# Patient Record
Sex: MALE | Race: OTHER | HISPANIC OR LATINO | URBAN - METROPOLITAN AREA
[De-identification: names, ages, dates, MRNs, and addresses within clinical notes are randomized per-mention and may not be internally consistent; named-entity substitution may affect disease eponyms.]

---

## 2020-01-09 ENCOUNTER — EMERGENCY (EMERGENCY)
Facility: HOSPITAL | Age: 25
LOS: 0 days | Discharge: HOME | End: 2020-01-09
Attending: EMERGENCY MEDICINE
Payer: MEDICAID

## 2020-01-09 VITALS
HEART RATE: 72 BPM | DIASTOLIC BLOOD PRESSURE: 75 MMHG | TEMPERATURE: 98 F | OXYGEN SATURATION: 100 % | RESPIRATION RATE: 18 BRPM | SYSTOLIC BLOOD PRESSURE: 136 MMHG

## 2020-01-09 VITALS — HEIGHT: 66 IN | WEIGHT: 125 LBS

## 2020-01-09 DIAGNOSIS — L29.9 PRURITUS, UNSPECIFIED: ICD-10-CM

## 2020-01-09 DIAGNOSIS — B01.9 VARICELLA WITHOUT COMPLICATION: ICD-10-CM

## 2020-01-09 DIAGNOSIS — R21 RASH AND OTHER NONSPECIFIC SKIN ERUPTION: ICD-10-CM

## 2020-01-09 PROCEDURE — 99282 EMERGENCY DEPT VISIT SF MDM: CPT

## 2020-01-09 PROCEDURE — 99283 EMERGENCY DEPT VISIT LOW MDM: CPT

## 2020-01-09 NOTE — ED STATDOCS - NSFOLLOWUPINSTRUCTIONS_ED_ALL_ED_FT
Please call and follow up with your doctor in 1-3 days.  Please call [phone] to find doctors in [facility].     Varicela, en adultos  Chickenpox, Adult  La varicela es harshad infección causada por un virus (infección viral). Provoca fiebre y luego harshad erupción cutánea que pica. Esta erupción se convierte en ampollas, que por último se convierten en costras. La varicela se transmite fácilmente de persona a persona (es contagiosa). La varicela comienza a ser contagiosa de 1 a 2 días antes de que aparezca la erupción cutánea. Sigue siéndolo hasta que las ampollas se convierten en costras. La varicela puede ser muy grave en los adultos. Entre las posibles complicaciones de la varicela, se incluyen:  Infección en la piel.Infección pulmonar (neumonía).Infección en el cerebro (encefalitis).Infección en el torrente sanguíneo (sepsis).Problemas de sangrado.Problemas de equilibrio y control muscular (ataxia cerebelosa).Nacimiento de un bebé con defectos congénitos, en las mujeres embarazadas.Si usted ya ha tenido varicela antes, es probable que no la tenga de nuevo.  ¿Cuáles son las causas?  Esta afección es causada por el virus de la varicela zóster. Puede contraer el virus de las siguientes maneras:  Al aspirar las gotitas que harshad persona infectada elimina al toser o estornudar.Al tener contacto con líquidos de la erupción de la varicela.Al tocar algo que estuvo expuesto al virus (fue contaminado) y luego tocarse la boca, nariz u ojos.¿Qué incrementa el riesgo?  Es más probable que esta afección se manifieste en:  Personas que nunca tuvieron varicela.Personas que nunca recibieron un medicamento para protegerse de la varicela (no rogers sido vacunadas).Trabajadores sanitarios.Personas que viven o pasan tiempo en lugares con mucha gente. Estos pueden incluir lo siguiente:  Estudiantes universitarios.Maestros.Militares.Personas que viven en harshad institución, janie harshad prisión.Personas que tienen debilitado el sistema que combate las enfermedades (inmunitario). Puede tener un sistema inmunitario debilitado si:  Tiene VIH virus de inmunodeficiencia humana.Tiene sida (síndrome de inmunodeficiencia adquirida).Está recibiendo quimioterapia.Usa medicamentos que reducen (suprimen) la actividad del sistema inmunitario.¿Cuáles son los signos o los síntomas?  Los síntomas de la varicela suelen ser peores en los adultos que en los niños. Entre los síntomas se pueden incluir los siguientes:  Harshad erupción cutánea que pica y se modifica con el tiempo:  La erupción cutánea comienza janie manchas spencer que luego se convierten en pequeñas protuberancias.Estas protuberancias se convierten en ampollas llenas de líquido.Las ampollas se secan y se convierten en costras, por lo general, de 3 a 7 días después de que comenzó la erupción cutánea.[place] y molestias corporales.Dolor de yoon.Cansancio.Fiebre.Los síntomas se presentan de 10 a 21 días después de que harshad persona ha estado expuesta al virus.  ¿Cómo se diagnostica?  Esta afección se puede diagnosticar en función de lo siguiente:  Blanca síntomas.Blanca antecedentes médicos.Un examen físico.Análisis de gordon.Análisis de harshad muestra de líquido (cultivo) de la erupción.¿Cómo se trata?  El tratamiento de esta afección puede incluir lo siguiente:  Susan medicamentos para acortar el curso de la enfermedad y hacerla menos grave.Aplicar harshad loción de calamina para aliviar la picazón.Usar bicarbonato de sodio o cassia de claudy para calmar la picazón en la piel.Susan un medicamento que alivie la picazón (antihistamínico).Susan antibióticos si también hay harshad infección bacteriana. Los antibióticos no curan las infecciones virales janie la varicela.Siga estas indicaciones en gregg casa:  Para aliviar el dolor, la picazón y las molestias        Trate de mantenerse fresco y al resguardo aj. La transpiración y el calor pueden empeorar la picazón.Los cassia con agua fría pueden brindar alivio. Pruebe agregar bicarbonato de sodio o claudy seca en el agua para reducir la picazón. No se bañe con [place].Aplique paños húmedos fríos (compresas frías) en las zonas que le piquen janie se lo haya indicado el médico.Use loción de calamina según se lo haya recomendado el médico. Se trata de harshad loción de venta angle que ayuda a aliviar la picazón.Si tiene ampollas en la boca, no coma ni lola nada condimentado, catarino o ácido. Las bebidas frías y los alimentos blandos y suaves son los más fáciles de tragar.No se rasque ni se toque la erupción cutánea. Para ayudar a evitar rascarse:  Tenga siempre las uñas cortas y limpias.Use guantes o mitones mientras duerme, si no puede dejar de rascarse.Medicamentos     [place] o aplíquese los medicamentos de venta angle y los recetados solamente janie se lo haya indicado el médico. Estos incluyen antihistamínicos y cremas contra la picazón.Si le recetaron un antibiótico, tómelo janie se lo haya indicado el médico. No deje de usar el medicamento aunque la afección mejore.Prevención de infecciones        Usted comienza a contagiar de 1 a 2 días antes de que aparezca la erupción cutánea. Y seguirá contagiando hasta que las ampollas se convierten en costras. Mientras contagie, evite estar cerca de:  Mujeres embarazadas.Bebés.Personas que reciben tratamientos contra el cáncer o corticoesteroides a ajit plazo.Personas con un sistema inmunitario débil.Personas de edad avanzada.Personas que no hayan tenido varicela antes.Personas que no se hayan vacunado contra la varicela.Quédese en gregg casa y no vaya al trabajo o a la escuela hasta que todas las ampollas se hayan convertido en costras y dejen de aparecer manchas nuevas, o por todo el tiempo que le indique gregg médico.Si alguien ha estado expuesto a gregg varicela, comuníquese con un médico para bello si la persona necesita vacunarse.Lávese las derik frecuentemente con agua y jabón. Use desinfectante para derik si no dispone de agua y jabón. Corrina que las otras personas que viven en gregg hogar también se laven las derik. Hacer esto:  Reduce blanca probabilidades de contraer harshad infección bacteriana en la piel.Reduce la probabilidad de que la varicela se contagie a otras personas.Instrucciones generales     Lola suficiente líquido janie para mantener la orina de color amarillo pálido.Descanse todo lo que sea necesario.Concurra a todas las visitas de seguimiento janie se lo haya indicado el médico. [place] es importante.¿Cómo se amy?  Vacunarse es la mejor manera de prevenir la varicela. Si no se ha vacunado, hable con gregg médico sobre la posibilidad de recibir la vacuna.Comuníquese con un médico si:  Tiene fiebre.Observa harshad secreción de color ramires amarillento proveniente de las ampollas.Tiene zonas de la piel que se vuelven spencer o dolorosas con la palpación o que se sienten calientes.Tiene tos.La orina es de color más oscuro que lo habitual.Solicite ayuda de inmediato si:  No puede dejar de vomitar.Se siente confundido.Está muy somnoliento.Tiene alguno de estos problemas:  Rigidez en el pauly.Dolor de yoon intenso.Dolor intenso o rigidez en las articulaciones.Dificultad para caminar o mantener el equilibrio.Convulsiones.Respiración acelerada.Dificultad para respirar.Dolor en el pecho.Dolor en los ojos, ojos rojos o dificultad para bello.Gordon en la orina o en la materia fecal.Comienza a tener muchos moretones en la piel.Las ampollas le sangran.Ampollas en los ojos.Tiene fiebre, y los síntomas empeoran repentinamente.Resumen  La varicela es harshad infección causada por un virus (infección viral).Provoca fiebre y luego harshad erupción cutánea que pica. Esta erupción se convierte en ampollas, que por último se convierten en costras.La varicela comienza a ser contagiosa de harshad persona a otra de 1 a 2 días antes de que aparezca la erupción cutánea. Sigue siéndolo hasta que las ampollas se convierten en costras.Vacunarse es la mejor manera de prevenir la varicela.Esta información no tiene janie fin reemplazar el consejo del médico. Asegúrese de hacerle al médico cualquier pregunta que tenga.

## 2020-01-09 NOTE — ED STATDOCS - SKIN, MLM
skin normal color for race, warm, dry and intact. +pt with rash neck and torso rash in different stages of healing multiple rashes with red base fluid filled sac.

## 2020-01-09 NOTE — ED STATDOCS - OBJECTIVE STATEMENT
23 y/o male with no pertinent PMHx presents to ED c/o bumps over his body since x2 days. Pt had some blisters on back +discharge from area x2 days ago.  Denies any other sx currently. Denies fevers. Pt is Malay speaking, pacific  used: [id number]. NKDA.

## 2020-01-09 NOTE — ED STATDOCS - CLINICAL SUMMARY MEDICAL DECISION MAKING FREE TEXT BOX
pt with rash on body consistent with varicella, discuss with pt symptomatic treatment and discharge.

## 2020-01-09 NOTE — ED STATDOCS - PATIENT PORTAL LINK FT
You can access the FollowMyHealth Patient Portal offered by [facility] by registering at the following website: [email or web address]. By joining [employer]’s FollowMyHealth portal, you will also be able to view your health information using other applications (apps) compatible with our system.

## 2021-09-29 NOTE — ED STATDOCS - NS ED ATTENDING STATEMENT MOD
Patient received a 250cc bolus for decreased urinary out put and lowering blood pressure per Dr. Tyler.   Attending Only

## 2022-04-14 NOTE — ED STATDOCS - CONSTITUTIONAL NEGATIVE STATEMENT, MLM
Chief Complaint   Patient presents with     Botox Migraine     Cheryl Castellanos     no fever and no chills.

## 2022-06-23 NOTE — ED STATDOCS - DISCHARGE DATE
09-Jan-2020 Xolair Pregnancy And Lactation Text: This medication is Pregnancy Category B and is considered safe during pregnancy. This medication is excreted in breast milk.

## 2023-06-22 ENCOUNTER — EMERGENCY (EMERGENCY)
Facility: HOSPITAL | Age: 28
LOS: 0 days | Discharge: HOME | End: 2023-06-23
Attending: EMERGENCY MEDICINE
Payer: MEDICAID

## 2023-06-22 VITALS — HEIGHT: 69 IN | WEIGHT: 154.98 LBS

## 2023-06-22 DIAGNOSIS — L03.90 CELLULITIS, UNSPECIFIED: ICD-10-CM

## 2023-06-22 DIAGNOSIS — R21 RASH AND OTHER NONSPECIFIC SKIN ERUPTION: ICD-10-CM

## 2023-06-22 PROBLEM — Z78.9 OTHER SPECIFIED HEALTH STATUS: Chronic | Status: ACTIVE | Noted: 2020-01-09

## 2023-06-22 LAB
BASOPHILS # BLD AUTO: 0.05 K/UL — SIGNIFICANT CHANGE UP (ref 0–0.2)
BASOPHILS NFR BLD AUTO: 0.8 % — SIGNIFICANT CHANGE UP (ref 0–2)
EOSINOPHIL # BLD AUTO: 0.24 K/UL — SIGNIFICANT CHANGE UP (ref 0–0.5)
EOSINOPHIL NFR BLD AUTO: 4 % — SIGNIFICANT CHANGE UP (ref 0–6)
HCT VFR BLD CALC: 44.9 % — SIGNIFICANT CHANGE UP (ref 39–50)
HGB BLD-MCNC: 15 G/DL — SIGNIFICANT CHANGE UP (ref 13–17)
IMM GRANULOCYTES NFR BLD AUTO: 0.3 % — SIGNIFICANT CHANGE UP (ref 0–0.9)
LYMPHOCYTES # BLD AUTO: 2.1 K/UL — SIGNIFICANT CHANGE UP (ref 1–3.3)
LYMPHOCYTES # BLD AUTO: 35 % — SIGNIFICANT CHANGE UP (ref 13–44)
MCHC RBC-ENTMCNC: 29.6 PG — SIGNIFICANT CHANGE UP (ref 27–34)
MCHC RBC-ENTMCNC: 33.4 GM/DL — SIGNIFICANT CHANGE UP (ref 32–36)
MCV RBC AUTO: 88.6 FL — SIGNIFICANT CHANGE UP (ref 80–100)
MONOCYTES # BLD AUTO: 0.68 K/UL — SIGNIFICANT CHANGE UP (ref 0–0.9)
MONOCYTES NFR BLD AUTO: 11.3 % — SIGNIFICANT CHANGE UP (ref 2–14)
NEUTROPHILS # BLD AUTO: 2.91 K/UL — SIGNIFICANT CHANGE UP (ref 1.8–7.4)
NEUTROPHILS NFR BLD AUTO: 48.6 % — SIGNIFICANT CHANGE UP (ref 43–77)
PLATELET # BLD AUTO: 227 K/UL — SIGNIFICANT CHANGE UP (ref 150–400)
RBC # BLD: 5.07 M/UL — SIGNIFICANT CHANGE UP (ref 4.2–5.8)
RBC # FLD: 12.3 % — SIGNIFICANT CHANGE UP (ref 10.3–14.5)
WBC # BLD: 6 K/UL — SIGNIFICANT CHANGE UP (ref 3.8–10.5)
WBC # FLD AUTO: 6 K/UL — SIGNIFICANT CHANGE UP (ref 3.8–10.5)

## 2023-06-22 PROCEDURE — 86753 PROTOZOA ANTIBODY NOS: CPT

## 2023-06-22 PROCEDURE — 87476 LYME DIS DNA AMP PROBE: CPT

## 2023-06-22 PROCEDURE — 99284 EMERGENCY DEPT VISIT MOD MDM: CPT

## 2023-06-22 PROCEDURE — 80053 COMPREHEN METABOLIC PANEL: CPT

## 2023-06-22 PROCEDURE — 85025 COMPLETE CBC W/AUTO DIFF WBC: CPT

## 2023-06-22 PROCEDURE — 36415 COLL VENOUS BLD VENIPUNCTURE: CPT

## 2023-06-22 RX ADMIN — Medication 100 MILLIGRAM(S): at 23:53

## 2023-06-22 NOTE — ED ADULT NURSE NOTE - OBJECTIVE STATEMENT
pt c/o left shoulder blade rash since sunday. pt has been using cortisone cream since yesterday for itch relief. pt denies any pain

## 2023-06-22 NOTE — ED ADULT NURSE NOTE - CAS EDN DISCHARGE ASSESSMENT
no acute respiratory distress, ambulating with steady gait, bulls eye rash noted to left scapula area, no c/o pain , no drainage/Alert and oriented to person, place and time

## 2023-06-22 NOTE — ED ADULT NURSE NOTE - NSFALLUNIVINTERV_ED_ALL_ED
Bed/Stretcher in lowest position, wheels locked, appropriate side rails in place/Call bell, personal items and telephone in reach/Instruct patient to call for assistance before getting out of bed/chair/stretcher/Non-slip footwear applied when patient is off stretcher/Challenge to call system/Physically safe environment - no spills, clutter or unnecessary equipment/Purposeful proactive rounding/Room/bathroom lighting operational, light cord in reach

## 2023-06-22 NOTE — ED ADULT TRIAGE NOTE - CHIEF COMPLAINT QUOTE
pt c/o left shoulder blade rash since sunday. pt has been using cortisone cream since yesterday for itch relief. pt denies any pain. - allergies, - pmhx. Pt A&ox4, ambulatory. No s/s of distress.

## 2023-06-23 VITALS
SYSTOLIC BLOOD PRESSURE: 102 MMHG | RESPIRATION RATE: 18 BRPM | TEMPERATURE: 98 F | DIASTOLIC BLOOD PRESSURE: 50 MMHG | HEART RATE: 60 BPM | OXYGEN SATURATION: 100 %

## 2023-06-23 LAB
ALBUMIN SERPL ELPH-MCNC: 4 G/DL — SIGNIFICANT CHANGE UP (ref 3.3–5)
ALP SERPL-CCNC: 90 U/L — SIGNIFICANT CHANGE UP (ref 40–120)
ALT FLD-CCNC: 25 U/L — SIGNIFICANT CHANGE UP (ref 12–78)
ANION GAP SERPL CALC-SCNC: 3 MMOL/L — LOW (ref 5–17)
AST SERPL-CCNC: 27 U/L — SIGNIFICANT CHANGE UP (ref 15–37)
BILIRUB SERPL-MCNC: 0.4 MG/DL — SIGNIFICANT CHANGE UP (ref 0.2–1.2)
BUN SERPL-MCNC: 14 MG/DL — SIGNIFICANT CHANGE UP (ref 7–23)
CALCIUM SERPL-MCNC: 8.8 MG/DL — SIGNIFICANT CHANGE UP (ref 8.5–10.1)
CHLORIDE SERPL-SCNC: 109 MMOL/L — HIGH (ref 96–108)
CO2 SERPL-SCNC: 30 MMOL/L — SIGNIFICANT CHANGE UP (ref 22–31)
CREAT SERPL-MCNC: 0.76 MG/DL — SIGNIFICANT CHANGE UP (ref 0.5–1.3)
EGFR: 124 ML/MIN/1.73M2 — SIGNIFICANT CHANGE UP
GLUCOSE SERPL-MCNC: 86 MG/DL — SIGNIFICANT CHANGE UP (ref 70–99)
POTASSIUM SERPL-MCNC: 3.8 MMOL/L — SIGNIFICANT CHANGE UP (ref 3.5–5.3)
POTASSIUM SERPL-SCNC: 3.8 MMOL/L — SIGNIFICANT CHANGE UP (ref 3.5–5.3)
PROT SERPL-MCNC: 7.5 GM/DL — SIGNIFICANT CHANGE UP (ref 6–8.3)
SODIUM SERPL-SCNC: 142 MMOL/L — SIGNIFICANT CHANGE UP (ref 135–145)

## 2023-06-23 NOTE — ED STATDOCS - NSFOLLOWUPCLINICS_GEN_ALL_ED_FT
[facility]  Family Medicine  [address] [place] [address]  Phone: [phone]  Fax:

## 2023-06-23 NOTE — ED STATDOCS - DIFFERENTIAL DIAGNOSIS
Differential Diagnosis redness on back likely cellulitis vs. lyme rash, labs, doxy and outpatient follow up.

## 2023-06-23 NOTE — ED STATDOCS - MUSCULOSKELETAL, MLM
range of motion is not limited and there is no muscle tenderness. No nuchal rigidity. no saddle anesthesia.
No

## 2023-06-23 NOTE — ED STATDOCS - PATIENT PORTAL LINK FT
You can access the FollowMyHealth Patient Portal offered by [facility] by registering at the following website: [email or web address]. By joining [email or web address]’s FollowMyHealth portal, you will also be able to view your health information using other applications (apps) compatible with our system.

## 2023-06-23 NOTE — ED STATDOCS - SKIN, MLM
skin normal color for race, warm, dry. left back there is a lenticular macular erythematous owl eye appearing lesion suspicious for lyme rash.

## 2023-06-27 LAB
B MICROTI IGG TITR SER: SIGNIFICANT CHANGE UP
B MICROTI IGM TITR SER: SIGNIFICANT CHANGE UP

## 2023-06-30 LAB — B BURGDOR DNA SPEC QL NAA+PROBE: NEGATIVE — SIGNIFICANT CHANGE UP

## 2024-12-26 PROBLEM — Z00.00 ENCOUNTER FOR PREVENTIVE HEALTH EXAMINATION: Status: ACTIVE | Noted: 2024-12-26

## 2025-01-03 ENCOUNTER — APPOINTMENT (OUTPATIENT)
Dept: UROLOGY | Facility: CLINIC | Age: 30
End: 2025-01-03
Payer: SELF-PAY

## 2025-01-03 VITALS
SYSTOLIC BLOOD PRESSURE: 129 MMHG | DIASTOLIC BLOOD PRESSURE: 67 MMHG | TEMPERATURE: 97.9 F | BODY MASS INDEX: 20.89 KG/M2 | HEART RATE: 71 BPM | OXYGEN SATURATION: 96 % | RESPIRATION RATE: 14 BRPM | HEIGHT: 66 IN | WEIGHT: 130 LBS

## 2025-01-03 DIAGNOSIS — N52.9 MALE ERECTILE DYSFUNCTION, UNSPECIFIED: ICD-10-CM

## 2025-01-03 DIAGNOSIS — Z78.9 OTHER SPECIFIED HEALTH STATUS: ICD-10-CM

## 2025-01-03 PROCEDURE — 99202 OFFICE O/P NEW SF 15 MIN: CPT

## 2025-01-03 RX ORDER — SILDENAFIL 100 MG/1
100 TABLET, FILM COATED ORAL
Qty: 30 | Refills: 11 | Status: ACTIVE | COMMUNITY
Start: 2025-01-03 | End: 1900-01-01

## 2025-01-04 LAB
ANION GAP SERPL CALC-SCNC: 11 MMOL/L
BUN SERPL-MCNC: 14 MG/DL
CALCIUM SERPL-MCNC: 9.6 MG/DL
CHLORIDE SERPL-SCNC: 102 MMOL/L
CO2 SERPL-SCNC: 27 MMOL/L
CREAT SERPL-MCNC: 0.92 MG/DL
EGFR: 114 ML/MIN/1.73M2
GLUCOSE SERPL-MCNC: 86 MG/DL
POTASSIUM SERPL-SCNC: 4.9 MMOL/L
SODIUM SERPL-SCNC: 140 MMOL/L
TESTOST SERPL-MCNC: 543 NG/DL

## 2025-01-06 PROBLEM — Z78.9 NO FAMILY HISTORY OF HYPERTENSION: Status: ACTIVE | Noted: 2025-01-06

## 2025-01-06 PROBLEM — Z78.9 CONSUMES ALCOHOL OCCASIONALLY: Status: ACTIVE | Noted: 2025-01-06

## 2025-01-06 PROBLEM — Z78.9 NON-SMOKER: Status: ACTIVE | Noted: 2025-01-06

## 2025-01-21 ENCOUNTER — APPOINTMENT (OUTPATIENT)
Dept: UROLOGY | Facility: CLINIC | Age: 30
End: 2025-01-21
Payer: SELF-PAY

## 2025-01-21 VITALS
WEIGHT: 128 LBS | SYSTOLIC BLOOD PRESSURE: 117 MMHG | HEIGHT: 66 IN | BODY MASS INDEX: 20.57 KG/M2 | OXYGEN SATURATION: 97 % | RESPIRATION RATE: 16 BRPM | HEART RATE: 72 BPM | DIASTOLIC BLOOD PRESSURE: 70 MMHG

## 2025-01-21 DIAGNOSIS — N52.9 MALE ERECTILE DYSFUNCTION, UNSPECIFIED: ICD-10-CM

## 2025-01-21 PROCEDURE — 54235 NJX CORPORA CAVERNOSA RX AGT: CPT

## 2025-01-21 PROCEDURE — 93980 PENILE VASCULAR STUDY: CPT
